# Patient Record
(demographics unavailable — no encounter records)

---

## 2024-11-13 NOTE — COUNSELING
[Nutrition/ Exercise/ Weight Management] : nutrition, exercise, weight management [Body Image] : body image [Vitamins/Supplements] : vitamins/supplements [Sunscreen] : sunscreen [Breast Self Exam] : breast self exam [Bladder Hygiene] : bladder hygiene [Contraception/ Emergency Contraception/ Safe Sexual Practices] : contraception, emergency contraception, safe sexual practices [Confidentiality] : confidentiality [STD (testing, results, tx)] : STD (testing, results, tx)

## 2024-11-13 NOTE — HISTORY OF PRESENT ILLNESS
[FreeTextEntry1] : Patient presents for her annual exam.  Reports normal menses, one partner, no abdominal or pelvic pain, change in discharge, change in bowel or bladder habits or any other concerns.  Due for pap.

## 2024-11-13 NOTE — PLAN
[FreeTextEntry1] : pap obtained Self breast exams, safe sex, diet and exercise discussed contraception discussed RTO prn and annually for exams

## 2024-11-20 NOTE — PLAN
[FreeTextEntry1] : Rx to pharmacy and meds given RTO in a month for repeat testing Discussed contraception with strict barrier methods

## 2024-11-20 NOTE — HISTORY OF PRESENT ILLNESS
[FreeTextEntry1] : Patient presents for results discussion and treatment.  + for chlamydia and gonorrhea.  Discussed transmission, treatment and partner treatment.  All questions answered.

## 2025-01-08 NOTE — HISTORY OF PRESENT ILLNESS
[FreeTextEntry1] : Patient presents for follow up EVER for STD treatment.  No new complaints. Reports no new partners.